# Patient Record
Sex: FEMALE | Employment: FULL TIME | URBAN - METROPOLITAN AREA
[De-identification: names, ages, dates, MRNs, and addresses within clinical notes are randomized per-mention and may not be internally consistent; named-entity substitution may affect disease eponyms.]

---

## 2019-08-26 ENCOUNTER — OFFICE VISIT (OUTPATIENT)
Dept: FAMILY MEDICINE | Facility: CLINIC | Age: 24
End: 2019-08-26
Payer: COMMERCIAL

## 2019-08-26 VITALS
SYSTOLIC BLOOD PRESSURE: 125 MMHG | OXYGEN SATURATION: 99 % | DIASTOLIC BLOOD PRESSURE: 84 MMHG | WEIGHT: 139.6 LBS | TEMPERATURE: 98.1 F | HEART RATE: 91 BPM

## 2019-08-26 DIAGNOSIS — Z32.00 PREGNANCY EXAMINATION OR TEST, PREGNANCY UNCONFIRMED: ICD-10-CM

## 2019-08-26 DIAGNOSIS — Z32.01 PREGNANCY TEST POSITIVE: Primary | ICD-10-CM

## 2019-08-26 LAB — HCG UR QL: POSITIVE

## 2019-08-26 PROCEDURE — 81025 URINE PREGNANCY TEST: CPT | Performed by: FAMILY MEDICINE

## 2019-08-26 PROCEDURE — 99202 OFFICE O/P NEW SF 15 MIN: CPT | Performed by: FAMILY MEDICINE

## 2019-08-26 ASSESSMENT — PAIN SCALES - GENERAL: PAINLEVEL: NO PAIN (0)

## 2019-08-26 NOTE — PROGRESS NOTES
Subjective     Devora Mejía is a 23 year old female who presents to clinic today for the following health issues:      HPI     -pregnancy confirmation. At home testing are positive this morning.  Recently moved into area from [facility] with [facility].  Pregnancy is wanted and she feels same at home.      Reviewed and updated as needed this visit by Provider  Tobacco  Allergies  Meds  Problems  Med Hx  Surg Hx  Fam Hx         Review of Systems   ROS COMP: Constitutional, HEENT, cardiovascular, pulmonary, GI, , musculoskeletal, neuro, skin, endocrine and psych systems are negative, except as otherwise noted.      Objective    /84 (BP Location: Left arm, Patient Position: Chair, Cuff Size: Adult Regular)   Pulse 91   Temp 98.1  F (36.7  C) (Oral)   Wt 63.3 kg (139 lb 9.6 oz)   LMP 07/17/2019 (Exact Date)   SpO2 99%   Breastfeeding? No   There is no height or weight on file to calculate BMI.  Physical Exam   GENERAL: healthy, alert and no distress  NECK: no adenopathy, no asymmetry, masses, or scars and thyroid normal to palpation  RESP: lungs clear to auscultation - no rales, rhonchi or wheezes  CV: regular rate and rhythm, normal S1 S2, no S3 or S4, no murmur, click or rub, no peripheral edema and peripheral pulses strong  ABDOMEN: soft, nontender, no hepatosplenomegaly, no masses and bowel sounds normal  MS: no gross musculoskeletal defects noted, no edema  SKIN: no suspicious lesions or rashes  NEURO: Normal strength and tone, mentation intact and speech normal  PSYCH: mentation appears normal, affect normal/bright    Diagnostic Test Results:  Labs reviewed in Epic  Results for orders placed or performed in visit on 08/26/19 (from the past 24 hour(s))   HCG qualitative urine - CSC and Range   Result Value Ref Range    HCG Qual Urine Positive (A) NEG^Negative           Assessment & Plan     1. Pregnancy test positive  Early pregnancy counseling given on morning sickness, caffeine, alcohol  and drugs. They will also  a OTC PNV.  - OB/GYN REFERRAL    2. Pregnancy examination or test, pregnancy unconfirmed    - HCG qualitative urine - CSC and Range      Return in about 2 weeks (around 9/9/2019) for OB.    Barbara Barraza MD  [facility]

## 2019-08-26 NOTE — PATIENT INSTRUCTIONS
Unisom 10 - 25 mg and Vitamin B6 - 25 mg both at bedtime for morning sickness and can be taking up to twice daily.  Patient Education     Adapting to Pregnancy: First Trimester    As your body adjusts, you may have to change or limit your daily activities. You ll need more rest. You may also need to use the energy you have more wisely.  Your changing body  Almost every part of your body is affected as you adapt to pregnancy. The uterus and cervix will begin to soften right away. You may not look very pregnant during the first 3 months. But you are likely to have some common signs of early pregnancy:    Nausea    Fatigue    Frequent urination    Mood swings    Bloating of the belly    Constipation    Heartburn    Missed or light periods (first trimester bleeding)    Nipple or breast tenderness and breast swelling  It s not too late to start good habits  What matters most is protecting your baby from this moment on. If you smoke, drink alcohol, or use drugs, now is the time to stop. If you need help, talk with your healthcare provider:    Smoking increases the risk of stillbirth or having a low-birth-weight baby. If you smoke, quit now.    Alcohol and drugs have been linked with miscarriage, birth defects, intellectual disability, and low birth weight. Do not drink alcohol or take drugs.  Tips to relieve nausea  Although nausea can happen at any time of the day, it may be worse in the morning. To help prevent nausea:    Eat small, light meals at frequent intervals.    Drink fluids often.    Get up slowly. Eat a few unsalted crackers before you get out of bed.    Avoid smells that bother you.    Avoid spicy and fatty foods.    Eat an ice pop in your favorite flavor.    Get plenty of rest.    Ask your healthcare provider about taking daljit or vitamin B6 for nausea and vomiting.    Talk with your healthcare provider if you take vitamins that upset your stomach.  Work concerns  The end of the first trimester is a good  time to discuss working during pregnancy with your employer. Follow your healthcare provider s advice if your job needs you to stand for a long time, work with hazardous tools, or even sit at a desk all day. Your workspace, workload, or scheduled hours may need to be adjusted. Perhaps you can change body postures more often or take an extra break.  Advice for travel  Talk to your healthcare provider first, but the second trimester may be the best time for any travel. You may be advised to avoid certain trips while you re pregnant. Food and water can be concerns in developing countries. Travel by car is a good choice, as you can stop, get out, and stretch. Bring snacks and water along. Fasten the lap belt below your belly, low over your hips. Also be sure to wear the shoulder harness.  Intimacy  Unless your healthcare provider tells you to, there is no reason to stop having sex while you re pregnant. You or your partner may notice changes in desire. Desire may be less in the first trimester, due to nausea and fatigue. In the second trimester, sex may be very enjoyable. The third trimester can be a challenge comfort-wise. Try different positions and see what s best for you both.  Date Last Reviewed: 10/1/2017    5394-1059 [employer]. [address], [place] [address]. All rights reserved. This information is not intended as a substitute for professional medical care. Always follow your healthcare professional's instructions.

## 2022-07-19 ENCOUNTER — LAB REQUISITION (OUTPATIENT)
Dept: LAB | Facility: CLINIC | Age: 27
End: 2022-07-19
Payer: COMMERCIAL

## 2022-07-19 DIAGNOSIS — Z36.85 ENCOUNTER FOR ANTENATAL SCREENING FOR STREPTOCOCCUS B: ICD-10-CM

## 2022-07-19 PROCEDURE — 87653 STREP B DNA AMP PROBE: CPT | Mod: ORL | Performed by: OBSTETRICS & GYNECOLOGY

## 2022-07-21 LAB — GP B STREP DNA SPEC QL NAA+PROBE: NEGATIVE

## 2022-09-20 ENCOUNTER — LAB REQUISITION (OUTPATIENT)
Dept: LAB | Facility: CLINIC | Age: 27
End: 2022-09-20
Payer: COMMERCIAL

## 2022-09-20 DIAGNOSIS — Z83.49 FAMILY HISTORY OF OTHER ENDOCRINE, NUTRITIONAL AND METABOLIC DISEASES: ICD-10-CM

## 2022-09-20 PROCEDURE — 84443 ASSAY THYROID STIM HORMONE: CPT | Mod: ORL | Performed by: NURSE PRACTITIONER

## 2022-09-21 LAB — TSH SERPL DL<=0.005 MIU/L-ACNC: 0.89 UIU/ML (ref 0.3–4.2)

## 2023-12-20 ENCOUNTER — LAB REQUISITION (OUTPATIENT)
Dept: LAB | Facility: CLINIC | Age: 28
End: 2023-12-20
Payer: COMMERCIAL

## 2023-12-20 DIAGNOSIS — Z13.29 ENCOUNTER FOR SCREENING FOR OTHER SUSPECTED ENDOCRINE DISORDER: ICD-10-CM

## 2023-12-20 DIAGNOSIS — Z12.4 ENCOUNTER FOR SCREENING FOR MALIGNANT NEOPLASM OF CERVIX: ICD-10-CM

## 2023-12-20 LAB — TSH SERPL DL<=0.005 MIU/L-ACNC: 1.25 UIU/ML (ref 0.3–4.2)

## 2023-12-20 PROCEDURE — G0145 SCR C/V CYTO,THINLAYER,RESCR: HCPCS | Performed by: NURSE PRACTITIONER

## 2023-12-20 PROCEDURE — 84443 ASSAY THYROID STIM HORMONE: CPT | Performed by: NURSE PRACTITIONER

## 2023-12-22 LAB
BKR LAB AP GYN ADEQUACY: NORMAL
BKR LAB AP GYN INTERPRETATION: NORMAL
BKR LAB AP HPV REFLEX: NORMAL
BKR LAB AP LMP: NORMAL
BKR LAB AP PREVIOUS ABNL DX: NORMAL
BKR LAB AP PREVIOUS ABNORMAL: NORMAL
PATH REPORT.COMMENTS IMP SPEC: NORMAL
PATH REPORT.COMMENTS IMP SPEC: NORMAL
PATH REPORT.RELEVANT HX SPEC: NORMAL